# Patient Record
Sex: MALE | Race: WHITE | Employment: FULL TIME | URBAN - METROPOLITAN AREA
[De-identification: names, ages, dates, MRNs, and addresses within clinical notes are randomized per-mention and may not be internally consistent; named-entity substitution may affect disease eponyms.]

---

## 2017-07-12 ENCOUNTER — HOSPITAL ENCOUNTER (OUTPATIENT)
Dept: PHYSICAL THERAPY | Age: 32
Discharge: HOME | End: 2017-07-12
Payer: COMMERCIAL

## 2017-07-12 PROCEDURE — 97110 THERAPEUTIC EXERCISES: CPT

## 2017-07-12 PROCEDURE — 97161 PT EVAL LOW COMPLEX 20 MIN: CPT

## 2017-07-12 NOTE — PROGRESS NOTES
PT DAILY TREATMENT NOTE/KNEE EVAL 3-16    Patient Name: Paddy Dangelo  Date:2017  : 1985  [x]  Patient  Verified  Payor: [address] [place] [address] Road / Plan: [employer] / Product Type: Workers Comp /    In [place] time:1056  Total Treatment Time (min): 31  Total Timed Codes (min)8  1:1 Treatment Time (MC only): 8  Visit #: 1 of 12    Treatment Area: Pain in right knee [M25.561]    SUBJECTIVE  Pain Level (0-10 scale): 5  [x]constant []intermittent [x]improving []worsening []no change since onset  WORSE lots of moving of the knee, better with sitting still. Any medication changes, allergies to medications, adverse drug reactions, diagnosis change, or new procedure performed?: [x] No    [] Yes (see summary sheet for update)  Subjective functional status/changes:     PLOF:I   Limitations to PLOF: P/O pain and LE subjective reports  Mechanism of Injury: work related injury on 2016, slipped in a muddy patch, caught self and injured the R knee  Current symptoms/Complaints: 34 YO male diagnosed as above and with S/S consistent with above diagnosis presents to skilled outpatient PT. CCO P/O pain in the right knee and also in anterior thigh quad (dull pain, more persistant) and infrapatellar region (intermittent , sharp and goes away after a little bit)  as well. Pain is 5/10 . WORSE lots of moving of the knee, better with sitting still. He reports a work related injury on 2016, slipped in a muddy patch, caught self and injured the R knee. He has since had an MRI and Arthroscopic surgery 17. He now presents for skilled outpatient PT.    Previous Treatment/Compliance: Urgent care, orthopedic, Pre op PT, MRI, cortisone injection  PMHx/Surgical Hx: none  Work Hx: presently OOW due to surgery, normally works Full time as an ,   Living Situation: lives in an apartment, first floor, no steps to enter,   Pt Goals: reconditioning, normal function after surgery  Barriers: [x]pain -financial -time X transportation -other  Motivation: high  Substance use: []Alcohol []Tobacco []other:   FABQ Score: []low []elevate  Cognition: A & O x 4 Other:    OBJECTIVE/EXAMINATION  Domestic Life: works full time , household, reading, hiking, running and electronics  Activity/Recreational Limitations: P/O pain  Mobility:I  Self Care:  I        Modality rationale:     Min Type Additional Details    [] Estim:  []Unatt       []IFC  []Premod                        []Other:  []w/ice   []w/heat  Position:  Location:    [] Estim: []Att    []TENS instruct  []NMES                    []Other:  []w/US   []w/ice   []w/heat  Position:  Location:    []  Traction: [] Cervical       []Lumbar                       [] Prone          []Supine                       []Intermittent   []Continuous Lbs:  [] before manual  [] after manual    []  Ultrasound: []Continuous   [] Pulsed                           []1MHz   []3MHz Location:  W/cm2:    []  Iontophoresis with dexamethasone         Location: [] Take home patch   [] In clinic    []  Ice     []  heat  []  Ice massage  []  Laser   []  Anodyne Position:  Location:    []  Laser with stim  []  Other: Position:  Location:    []  Vasopneumatic Device Pressure:       [] lo [] med [] hi   Temperature: [] lo [] med [] hi   [] Skin assessment post-treatment:  []intact []redness- no adverse reaction    []redness  adverse reaction:     23 min [x]Eval                  []Re-Eval       8 min Therapeutic Exercise:  [x] See flow sheet :   Rationale: increase ROM, increase strength and improve coordination to improve the patients ability to aid with increase tolerance to ADLS and activities     min Therapeutic Activity:  []  See flow sheet :   Rationale:   to improve the patients ability to       min Neuromuscular Re-education:  []  See flow sheet :   Rationale:   to improve the patients ability to      min Manual Therapy:     Rationale:  to      min Gait Training:  ___ feet with ___ device on level surfaces with ___ level of assist   Rationale:           With   [] TE   [] TA   [] neuro   [] other: Patient Education: [x] Review HEP    [] Progressed/Changed HEP based on:   [] positioning   [] body mechanics   [] transfers   [] heat/ice application    [] other:      Other Objective/Functional Measures: quad set L excellent, R Fair    Physical Therapy Evaluation - Knee    Posture: [] Varus    [] Valgus    [] Recurvatum        [] Tibial Torsion    [] Foot Supination    [] Foot Pronation    Describe:    Gait:  [] Normal    [x] Abnormal    [x] Antalgic    [] NWB    Device:NONE    Describe:WBAT R, decrease stride, decrease pace, decrease heel toe,    ROM / Strength  [] Unable to assess                  AROM                      PROM                   Strength (1-5)    Left Right Left Right Left Right   Hip Flexion     5 4    Extension          Abduction          Adduction         Knee Flexion 135 supine 105 sitting  117 supine   5 4    Extension -3 sitting -10 sitting  -5 supine   5 4-   Ankle Plantarflexion           Dorsiflexion             Flexibility: [] Unable to assess at this time  Hamstrings:    (L) Tightness= [] WNL   [x] Min   [] Mod   [] Severe    (R) Tightness= [] WNL   [x] Min   [] Mod   [] Severe  Quadriceps:    (L) Tightness= [] WNL   [] Min   [] Mod   [] Severe    (R) Tightness= [] WNL   [] Min   [] Mod   [] Severe  Gastroc:      (L) Tightness= [] WNL   [x] Min   [] Mod   [] Severe    (R) Tightness= [] WNL   [x] Min   [] Mod   [] Severe  Other:    Palpation:  Generalized P/O TTP R knee   Neg/Pos  Neg/Pos  Neg/Pos   Joint Line  Quad tendon  Patellar ligament    Patella  Fibular head  Pes Anserinus    Tibial tubercle  Hamstring tendons  Infrapatellar fat pad      Optional Tests:  Patellar Positioning (Static)   []L []R Normal []L []R Lateral   []L []R Debbie Somers      []L []R Medial   []L []R Baja    Patellar Tracking   []L []R Glide (Lat)   []L []R Tilt (Lat)     []L []R Glide (Med)  []L []R Tilt (Med)      []L []R Gab (Inf)     Patellar Mobility   []L []R Hypermobile []L [x]R Hypomobile         Girth Measurements:     Cm at  Cm above joint line   Cm at   Cm below joint line  Cm at joint line   Left        Right           Lachmans  [] Neg    [] Pos Posterior Drawer [] Neg    [] Pos  Pivot Shift  [] Neg    [] Pos Posterior Sag  [] Neg    [] Pos  SELVIN   [] Neg    [] Pos Pieter's Test [] Neg    [] Pos  ALRI   [] Neg    [] Pos Squat   [] Neg    [] Pos  Valgus@ 0 Degrees [] Neg    [] Pos Aba-Riki [] Neg    [] Pos  Valgus@ 30 Degrees [] Neg    [] Pos Patellar Apprehension [] Neg    [] Pos  Varus@ 0 Degrees [] Neg    [] Pos Lemus's Compression [] Neg    [] Pos  Varus@ 30 Degrees [] Neg    [] Pos Ely's Test  [] Neg    [] Pos  Apley's Compression [] Neg    [] Pos Rolando's Test  [] Neg    [] Pos  Apley's Distraction [] Neg    [] Pos Stroke Test  [] Neg    [] Pos   Anterior Drawer [] Neg    [] Pos Fluctuation Test [] Neg    [] Pos  Other:                  [] Neg    [] Pos                 Other tests/comments: crepitus L none,  R mild       Pain Level (0-10 scale) post treatment: 5    ASSESSMENT/Changes in Function: Patient demonstrates the potential to make gains with improved ROM, strength, endurance/activity tolerance, functional FOTO survey score and all within a reasonable time frame so as to increase their functional independence with ADLs and activities for carryover to  Improved quality of life, tolerance to work demands and community activiites. Patient requires skilled Physical Therapy so as to monitor their response to and modify their treatment plan accordingly. Patient appears to be an appropriate candidate for skilled outpatient Physical Therapy.     Patient will continue to benefit from skilled PT services to modify and progress therapeutic interventions, address functional mobility deficits, address ROM deficits, address strength deficits, analyze and address soft tissue restrictions, analyze and cue movement patterns, analyze and modify body mechanics/ergonomics, assess and modify postural abnormalities and instruct in home and community integration to attain remaining goals.      [x]  See Plan of Care  []  See progress note/recertification  []  See Discharge Summary         Progress towards goals / Updated goals:       PLAN  [x]  Upgrade activities as tolerated     [x]  Continue plan of care  []  Update interventions per flow sheet       []  Discharge due to:_  []  Other:_      Sandrita Parry, PT 7/12/2017  10:16 AM

## 2017-07-12 NOTE — PROGRESS NOTES
[facility]  [address] [facility], [address] [address],[place] [address]  [phone] fax    Plan of Care/ Statement of Necessity for Physical Therapy Services    Patient name: Emir Thompson Start of Care: 2017   Referral source: Darien Parnell MD : 1985    Medical Diagnosis: Pain in right knee [M25.561]   Onset Date:16   Treatment Diagnosis: decrease tolerance to ADLs and activities due to R knee P/O pain, LOM, decrease strength   Prior Hospitalization: see medical history Provider#: [id number]   Medications: Verified on Patient summary List    Comorbidities: none   Prior Level of Function: I all areas of ADLS and activities, no AD use, works full time ,household, reading, hiking, running and electronics      The Plan of Care and following information is based on the information from the initial evaluation. Assessment/ key information: 34 YO male diagnosed as above and with S/S consistent with above diagnosis presents to skilled outpatient PT. CCO P/O pain in the right knee and also in anterior thigh quad (dull pain, more persistant) and infrapatellar region (intermittent , sharp and goes away after a little bit)  as well. Pain is 5/10 . WORSE lots of moving of the knee, better with sitting still. He reports a work related injury on 2016, slipped in a muddy patch, caught self and injured the R knee. He has since had an MRI and Arthroscopic surgery 17. He now presents for skilled outpatient PT. Previous Treatment/Compliance: Urgent care, orthopedic, Pre op PT, MRI, cortisone injection.   Pain 5, FOTO 56 , quad set L excellent, R Fair, abnormal antalgic gait with  No AD use, WBAT R, decrease stride, decrease pace, decrease heel toe, AROM R knee Supine F 117 and E -5, MMT R hip F and knee F 4 and Knee E 4-, Min B gastroc and hamstring tightness,   Generalized TTP P/O R knee, mild crepitus R,  Patient demonstrates the potential to make gains with improved ROM, strength, endurance/activity tolerance, functional FOTO survey score and all within a reasonable time frame so as to increase their functional independence with ADLs and activities for carryover to  Improved quality of life, tolerance to work demands and community activiites. Patient requires skilled Physical Therapy so as to monitor their response to and modify their treatment plan accordingly. Patient appears to be an appropriate candidate for skilled outpatient Physical Therapy.      Evaluation Complexity History LOW Complexity : Zero comorbidities / personal factors that will impact the outcome / POC; Examination HIGH Complexity : 4+ Standardized tests and measures addressing body structure, function, activity limitation and / or participation in recreation  ;Presentation LOW Complexity : Stable, uncomplicated  ;Clinical Decision Making MEDIUM Complexity : FOTO score of 26-74  Overall Complexity Rating: LOW   Problem List: pain affecting function, decrease ROM, decrease strength, edema affecting function, impaired gait/ balance, decrease ADL/ functional abilitiies, decrease activity tolerance, decrease flexibility/ joint mobility, decrease transfer abilities and other FOTO 56   Treatment Plan may include any combination of the following: Therapeutic exercise, Therapeutic activities, Neuromuscular re-education, Physical agent/modality, Gait/balance training, Manual therapy, Patient education, Self Care training, Functional mobility training, Home safety training, Stair training and Other:    Patient / Family readiness to learn indicated by: asking questions, trying to perform skills and interest  Persons(s) to be included in education: patient (P)  Barriers to Learning/Limitations: None  Patient Goal (s): reconditioning, normal function after surgery  Patient Self Reported Health Status: good  Rehabilitation Potential: good    Short Term Goals:  To be accomplished in 5 treatments:   1 patient will have established and be independent with HEP to aid with progression of skilled PT program   EVAL issued   CURRENT   2 patient will have pain 3/10 to aid with increase tolerance to ADLs and activities   EVAL 5   CURRENT   3 patient will have FOTO  Improved to 62 to aid with increase tolerance to ADLs and activities   EVAL 56   CURRENT     Long Term Goals: To be accomplished in 12 treatments:   1 patient will have pain 1/10 to aid with increase tolerance to ADLs and activities   EVAL 5   CURRENT   2 patient will have FOTO  Improved to 68 to aid with increase tolerance to ADLs and activities   EVAL 56   CURRENT   3 patient will have ROM 0-120 to aid with improved gait mechanics for carryover to work demands   EVAL -5 supine and 117 supine   CURRENT   4 patient will have MMT R knee F/E/hip F 4+ to aid with increase tolerance to ADLS and activities   EVAL Hip and knee F 4 and knee E4-   CURRENT   5 patient will ambulate on TM 1/4 mile with no significant increase pain for carryover to ADLS and activities and work demands   EVAL no AD use, antalgic gait. CURRENT    Frequency / Duration: Patient to be seen 2-3 times per week for 12 treatments. Patient/ Caregiver education and instruction: Diagnosis, prognosis, self care, activity modification and exercises   [x]  Plan of care has been reviewed with MATT Delong, PT 7/12/2017 10:17 AM  ________________________________________________________________________    I certify that the above Therapy Services are being furnished while the patient is under my care. I agree with the treatment plan and certify that this therapy is necessary.     Physician's Signature:____________________  Date:____________Time: _________    Please sign and return to [facility] [address] [place] [address], [address] [place] [address],[facility] [address]  [phone] fax

## 2017-07-18 ENCOUNTER — APPOINTMENT (OUTPATIENT)
Dept: PHYSICAL THERAPY | Age: 32
End: 2017-07-18
Payer: COMMERCIAL

## 2017-07-19 ENCOUNTER — HOSPITAL ENCOUNTER (OUTPATIENT)
Dept: PHYSICAL THERAPY | Age: 32
Discharge: HOME | End: 2017-07-19
Payer: COMMERCIAL

## 2017-07-19 PROCEDURE — 97035 APP MDLTY 1+ULTRASOUND EA 15: CPT

## 2017-07-19 PROCEDURE — 97110 THERAPEUTIC EXERCISES: CPT

## 2017-07-19 PROCEDURE — 97140 MANUAL THERAPY 1/> REGIONS: CPT

## 2017-07-19 NOTE — PROGRESS NOTES
PT DAILY TREATMENT NOTE - [facility]     Patient Name: Danilo Stevens  Date:2017  : 1985  [x]  Patient  Verified  Payor: [address] [place] [address] Road / Plan: [employer] / Product Type: Workers Comp /    In time:9:55  Out time:10:42  Total Treatment Time (min): 39   Visit #: 2 of 12    Treatment Area: Pain in right knee [M25.561]    SUBJECTIVE  Pain Level (0-10 scale): 3  Any medication changes, allergies to medications, adverse drug reactions, diagnosis change, or new procedure performed?: [x] No    [] Yes (see summary sheet for update)  Subjective functional status/changes:   [] No changes reported  Little  Pain.     OBJECTIVE    Modality rationale: decrease edema, decrease inflammation, decrease pain and increase tissue extensibility to improve the patients ability to perform ADL    Min Type Additional Details    [] Estim:  []Unatt       []IFC  []Premod                        []Other:  []w/ice   []w/heat  Position:  Location:    [] Estim: []Att    []TENS instruct  []NMES                    []Other:  []w/US   []w/ice   []w/heat  Position:  Location:    []  Traction: [] Cervical       []Lumbar                       [] Prone          []Supine                       []Intermittent   []Continuous Lbs:  [] before manual  [] after manual    [x]  Ultrasound: [x]Continuous   [] Pulsed              8             [x]1MHz   []3MHz W/cm2:1.3  Location:inferior  (R) knee    []  Iontophoresis with dexamethasone         Location: [] Take home patch   [] In clinic    []  Ice     []  heat  []  Ice massage  []  Laser   []  Anodyne Position:  Location:    []  Laser with stim  []  Other:  Position:  Location:    []  Vasopneumatic Device Pressure:       [] lo [] med [] hi   Temperature: [] lo [] med [] hi   [x] Skin assessment post-treatment:  [x]intact []redness- no adverse reaction    []redness  adverse reaction:      min []Eval                  []Re-Eval       23 min Therapeutic Exercise:  [x] See flow sheet :   Rationale: increase ROM and increase strength to improve the patients ability to perform ADL     min Therapeutic Activity:  []  See flow sheet :   Rationale:   to improve the patients ability to       min Neuromuscular Re-education:  []  See flow sheet :   Rationale:   to improve the patients ability to     8 min Manual Therapy:  Patella  Mob/passive stretch  Knee  F/EXT   Rationale: decrease pain, increase ROM, increase tissue extensibility and decrease edema  to perform ADL      min Gait Training:  ___ feet with ___ device on level surfaces with ___ level of assist   Rationale: With   [x] TE   [] TA   [] neuro   [] other: Patient Education: [x] Review HEP    [] Progressed/Changed HEP based on:   [] positioning   [] body mechanics   [] transfers   [] heat/ice application    [] other:      Other Objective/Functional Measures:  Minimal  Pain with  Full knee  Flex  During  manual    Pain Level (0-10 scale) post treatment: 0    ASSESSMENT/Changes in Function: Completed  Each there ex. Patient will continue to benefit from skilled PT services to address functional mobility deficits, address ROM deficits, address strength deficits, analyze and address soft tissue restrictions, analyze and cue movement patterns and instruct in home and community integration to attain remaining goals.      [x]  See Plan of Care  []  See progress note/recertification  []  See Discharge Summary         Progress towards goals / Updated goals:             1 patient will have established and be independent with HEP to aid with progression of skilled PT program                         EVAL issued                         CURRENT                         2 patient will have pain 3/10 to aid with increase tolerance to ADLs and activities                         EVAL 5                         CURRENT                         3 patient will have FOTO  Improved to 62 to aid with increase tolerance to ADLs and activities                         EVAL 56 CURRENT      Long Term Goals: To be accomplished in 12 treatments:                         1 patient will have pain 1/10 to aid with increase tolerance to ADLs and activities                         EVAL 5                         CURRENT                         2 patient will have FOTO  Improved to 68 to aid with increase tolerance to ADLs and activities                         EVAL 56                         CURRENT                         3 patient will have ROM 0-120 to aid with improved gait mechanics for carryover to work demands                         EVAL -5 supine and 117 supine                         CURRENT                         4 patient will have MMT R knee F/E/hip F 4+ to aid with increase tolerance to ADLS and activities                         EVAL Hip and knee F 4 and knee E4-                         CURRENT                         5 patient will ambulate on TM 1/4 mile with no significant increase pain for carryover to ADLS and activities and work demands                         EVAL no AD use, antalgic gait.                          CURRENT    PLAN  []  Upgrade activities as tolerated     [x]  Continue plan of care  []  Update interventions per flow sheet       []  Discharge due to:_  []  Other:_      Lyndsey Correa PTA 7/19/2017  9:58 AM    Future Appointments  Date Time Provider Franko Lomas   7/19/2017 10:00 AM Lyndsey Correa PTA [facility]   7/21/2017 10:00 AM Karl Galo, PT [facility]   7/24/2017 11:00 AM Lyndsey Correa PTA [facility]   7/26/2017 9:30 AM Karl Galo, PT [facility]   7/28/2017 10:30 AM [facility]   7/31/2017 10:00 AM Lyndsey Correa PTA [facility]

## 2017-07-21 ENCOUNTER — HOSPITAL ENCOUNTER (OUTPATIENT)
Dept: PHYSICAL THERAPY | Age: 32
Discharge: HOME | End: 2017-07-21
Payer: COMMERCIAL

## 2017-07-21 PROCEDURE — 97110 THERAPEUTIC EXERCISES: CPT

## 2017-07-21 PROCEDURE — 97035 APP MDLTY 1+ULTRASOUND EA 15: CPT

## 2017-07-21 NOTE — PROGRESS NOTES
PT DAILY TREATMENT NOTE     Patient Name: Travon Padilla  PFWX:  : 1985  [x]  Patient  Verified  Payor: Ra Moyer / Plan: Harley Boast / Product Type: Workers Comp /    In time:1000  Out time:1056  Total Treatment Time (min): 64  Visit #: 3 of 12    Treatment Area: Pain in right knee [M25.561]    SUBJECTIVE  Pain Level (0-10 scale): 2-3  Any medication changes, allergies to medications, adverse drug reactions, diagnosis change, or new procedure performed?: [x] No    [] Yes (see summary sheet for update)  Subjective functional status/changes:   [] No changes reported  I think some of the exercises are a little easy, I was used to doing more at the other place ( note however this was preop)    OBJECTIVE    Modality rationale: decrease inflammation, decrease pain and increase tissue extensibility to improve the patients ability to aid with increase tolerance to ADLs.     Min Type Additional Details    [] Estim:  []Unatt       []IFC  []Premod                        []Other:  []w/ice   []w/heat  Position:  Location:    [] Estim: []Att    []TENS instruct  []NMES                    []Other:  []w/US   []w/ice   []w/heat  Position:  Location:    []  Traction: [] Cervical       []Lumbar                       [] Prone          []Supine                       []Intermittent   []Continuous Lbs:  [] before manual  [] after manual   8 [x]  Ultrasound: [x]Continuous   [] Pulsed                           [x]1MHz   []3MHz W/cm2:1.3  Location:R KNEE    []  Iontophoresis with dexamethasone         Location: [] Take home patch   [] In clinic   10 [x]  Ice   post  []  heat  []  Ice massage  []  Laser   []  Anodyne Position:reclined  Location:R knee    []  Laser with stim  []  Other:  Position:  Location:    []  Vasopneumatic Device Pressure:       [] lo [] med [] hi   Temperature: [] lo [] med [] hi   [] Skin assessment post-treatment:  []intact []redness- no adverse reaction    []redness  adverse reaction: min []Eval                  []Re-Eval       38 min Therapeutic Exercise:  [x] See flow sheet :   Rationale: increase ROM, increase strength and improve coordination to improve the patients ability to aid with increase tolerance to ADLS and activities     min Therapeutic Activity:  []  See flow sheet :   Rationale:   to improve the patients ability to       min Neuromuscular Re-education:  []  See flow sheet :   Rationale:   to improve the patients ability to      min Manual Therapy:     Rationale:  to      min Gait Training:  ___ feet with ___ device on level surfaces with ___ level of assist   Rationale: With   [] TE   [] TA   [] neuro   [] other: Patient Education: [x] Review HEP    [] Progressed/Changed HEP based on:   [] positioning   [] body mechanics   [] transfers   [] heat/ice application    [] other:      Other Objective/Functional Measures: increased reps on heel slides    Pain Level (0-10 scale) post treatment:2-3    ASSESSMENT/Changes in Function: tolerated well. Patient will continue to benefit from skilled PT services to modify and progress therapeutic interventions, address functional mobility deficits, address ROM deficits, address strength deficits, analyze and address soft tissue restrictions, analyze and cue movement patterns, analyze and modify body mechanics/ergonomics, assess and modify postural abnormalities and instruct in home and community integration to attain remaining goals.      [x]  See Plan of Care  []  See progress note/recertification  []  See Discharge Summary         Progress towards goals / Updated goals:     1 patient will have established and be independent with HEP to aid with progression of skilled PT program                         EVAL issued                         [id number] reports compliance 7/21/17                         2 patient will have pain 3/10 to aid with increase tolerance to ADLs and activities                         EVAL 5                         CURRENT 2-3 7/21/17                         3 patient will have FOTO  Improved to 62 to aid with increase tolerance to ADLs and activities                         EVAL 56  22 [place] Am[place]o be accomplished in 12 treatments:                         5 patient will have pain 1/10 to aid with increase tolerance to ADLs and activities                         EVAL 5                         CURRENT 3 7/27/17                         2 patient will have FOTO  Improved to 68 to aid with increase tolerance to ADLs and activities                         YIHX 15                         DGBJODQ                         3 patient will have ROM 0-120 to aid with improved gait mechanics for carryover to work demands                         EVAL -5 supine and 117 supine                         CURRENT                         4 patient will have MMT R knee F/E/hip F 4+ to aid with increase tolerance to ADLS and activities                         EVAL Hip and knee F 4 and knee E4-                         CURRENT                         5 patient will ambulate on TM 1/4 mile with no significant increase pain for carryover to ADLS and activities and work demands                         EVAL no AD use, antalgic gait.                          CURRENT    PLAN  [x]  Upgrade activities as tolerated     [x]  Continue plan of care  []  Update interventions per flow sheet       []  Discharge due to:_  [x]  Other:_  Add 2# to PRES for SLR, SAQ and [place], PT 7/21/2017  10:30 AM    Future Appointments  Date Time Provider Franko Lomas   7/24/2017 11:00 AM Lyndsey Caceres PTA [facility]   7/26/2017 9:30 AM Nevin Da Silva PT [facility]   7/28/2017 10:30 AM [facility]   7/31/2017 10:00 AM Lyndsey Correa PTA [facility]

## 2017-07-24 ENCOUNTER — HOSPITAL ENCOUNTER (OUTPATIENT)
Dept: PHYSICAL THERAPY | Age: 32
Discharge: HOME | End: 2017-07-24
Payer: COMMERCIAL

## 2017-07-24 PROCEDURE — 97035 APP MDLTY 1+ULTRASOUND EA 15: CPT

## 2017-07-24 PROCEDURE — 97110 THERAPEUTIC EXERCISES: CPT

## 2017-07-24 NOTE — PROGRESS NOTES
PT DAILY TREATMENT NOTE - [facility]     Patient Name: Susan Amezcua  Date:2017  : 1985  [x]  Patient  Verified  Payor: [address] [place] [address] Road / Plan: Huan Aldana / Product Type: Workers Comp /    In time:11:00  Out time:12:04  Total Treatment Time (min): 56  Visit #: 4 of 12    Treatment Area: Pain in right knee [M25.561]    SUBJECTIVE  Pain Level (0-10 scale): 2  Any medication changes, allergies to medications, adverse drug reactions, diagnosis change, or new procedure performed?: [x] No    [] Yes (see summary sheet for update)  Subjective functional status/changes:   [] No changes reported  Little pain.     OBJECTIVE    Modality rationale: decrease edema, decrease inflammation, decrease pain and increase tissue extensibility to improve the patients ability to perform ADL    Min Type Additional Details    [] Estim:  []Unatt       []IFC  []Premod                        []Other:  []w/ice   []w/heat  Position:  Location:    [] Estim: []Att    []TENS instruct  []NMES                    []Other:  []w/US   []w/ice   []w/heat  Position:  Location:    []  Traction: [] Cervical       []Lumbar                       [] Prone          []Supine                       []Intermittent   []Continuous Lbs:  [] before manual  [] after manual    [x]  Ultrasound: [x]Continuous   [] Pulsed              8             [x]1MHz   []3MHz W/cm2:1.3  Location:(R)  knee    []  Iontophoresis with dexamethasone         Location: [] Take home patch   [] In clinic   10 [x]  Ice  post   []  heat  []  Ice massage  []  Laser   []  Anodyne Position:supine  Location:(L) knee    []  Laser with stim  []  Other:  Position:  Location:    []  Vasopneumatic Device Pressure:       [] lo [] med [] hi   Temperature: [] lo [] med [] hi   [x] Skin assessment post-treatment:  [x]intact []redness- no adverse reaction    []redness  adverse reaction:      min []Eval                  []Re-Eval       33 min Therapeutic Exercise:  [x] See flow sheet :   Rationale: increase ROM and increase strength to improve the patients ability to perform ADL      min Therapeutic Activity:  []  See flow sheet :   Rationale:   to improve the patients ability to       min Neuromuscular Re-education:  []  See flow sheet :   Rationale:   to improve the patients ability to     5 min Manual Therapy:  Passive  Stretch  Knee  F/EXT   Rationale: decrease pain, increase ROM, increase tissue extensibility and decrease edema  to perform ADL      min Gait Training:  ___ feet with ___ device on level surfaces with ___ level of assist   Rationale: With   [x] TE   [] TA   [] neuro   [] other: Patient Education: [x] Review HEP    [] Progressed/Changed HEP based on:   [] positioning   [] body mechanics   [] transfers   [] heat/ice application    [] other:      Other Objective/Functional Measures:  Knee  F  130   Ext  -5  Ambulated  100ft . Pain Level (0-10 scale) post treatment: 0    ASSESSMENT/Changes in Function: Minimal  Pain at  Incision. Fair  Response to each there ex. Improved  In knee  Flexion. Patient will continue to benefit from skilled PT services to address functional mobility deficits, address ROM deficits, address strength deficits, analyze and address soft tissue restrictions, analyze and cue movement patterns and instruct in home and community integration to attain remaining goals.      [x]  See Plan of Care  []  See progress note/recertification  []  See Discharge Summary         Progress towards goals / Updated goals:   1 patient will have established and be independent with HEP to aid with progression of skilled PT program                         EVAL issued                         [facility] reports compliance 7/21/17                         2 patient will have pain 3/10 to aid with increase tolerance to ADLs and activities                         EVAL 5                         CURRENT 2-3 7/21/17                         3 patient will have FOTO  Improved to 62 to aid with increase tolerance to ADLs and activities                         EVAL 56  22 [place] Amadeo be accomplished in 12 treatments:                         6 patient will have pain 1/10 to aid with increase tolerance to ADLs and activities                         EVAL 5                         CURRENT 3 7/27/17                         2 patient will have FOTO  Improved to 68 to aid with increase tolerance to ADLs and activities                         GUBF 97                         WSMWKBJ                         3 patient will have ROM 0-120 to aid with improved gait mechanics for carryover to work demands                         EVAL -5 supine and 117 supine                         CURRENT  130  F    Ext   -5   7/24/17                         4 patient will have MMT R knee F/E/hip F 4+ to aid with increase tolerance to ADLS and activities                         EVAL Hip and knee F 4 and knee E4-                         CURRENT                         5 patient will ambulate on TM 1/4 mile with no significant increase pain for carryover to ADLS and activities and work demands                         EVAL no AD use, antalgic gait.                          CURRENT       PLAN  []  Upgrade activities as tolerated     [x]  Continue plan of care  []  Update interventions per flow sheet       []  Discharge due to:_  []  Other:_      Lyndsey Correa PTA 7/24/2017  11:24 AM    Future Appointments  Date Time Provider Franko Lomas   7/26/2017 9:30 AM Eliud Dior, PT [facility]   7/28/2017 10:30 AM [facility]   7/31/2017 10:00 AM Lyndsey Correa PTA [facility]

## 2017-07-26 ENCOUNTER — HOSPITAL ENCOUNTER (OUTPATIENT)
Dept: PHYSICAL THERAPY | Age: 32
Discharge: HOME | End: 2017-07-26
Payer: COMMERCIAL

## 2017-07-26 PROCEDURE — 97110 THERAPEUTIC EXERCISES: CPT

## 2017-07-26 PROCEDURE — 97035 APP MDLTY 1+ULTRASOUND EA 15: CPT

## 2017-07-26 NOTE — PROGRESS NOTES
PT DAILY TREATMENT NOTE     Patient Name: Kasey Ventura  Date:2017  : 1985  [x]  Patient  Verified  Payor: [address] [place] [address] Road / Plan: [employer] / Product Type: Workers Comp /    In [place]ia Jackson UAyaka Garces.  Total Treatment Time (min): 52  Visit #: 5 of 12    Treatment Area: Pain in right knee [M25.561]    SUBJECTIVE  Pain Level (0-10 scale): 2  Any medication changes, allergies to medications, adverse drug reactions, diagnosis change, or new procedure performed?: [x] No    [] Yes (see summary sheet for update)  Subjective functional status/changes:   [] No changes reported  I feel better and I have more ROM but I know am not able to run. I haven't tried , only fast walking and I feel bumbly like it is going to give out.     OBJECTIVE    Modality rationale: decrease inflammation, decrease pain and increase tissue extensibility to improve the patients ability to aid with increase tolerance to ADLS and activities   Min Type Additional Details    [] Estim:  []Unatt       []IFC  []Premod                        []Other:  []w/ice   []w/heat  Position:  Location:    [] Estim: []Att    []TENS instruct  []NMES                    []Other:  []w/US   []w/ice   []w/heat  Position:  Location:    []  Traction: [] Cervical       []Lumbar                       [] Prone          []Supine                       []Intermittent   []Continuous Lbs:  [] before manual  [] after manual   8 [x]  Ultrasound: [x]Continuous   [] Pulsed                           [x]1MHz   []3MHz W/cm2:1.3  Location:R knee    []  Iontophoresis with dexamethasone         Location: [] Take home patch   [] In clinic   10 [x]  Ice post    []  heat  []  Ice massage  []  Laser   []  Anodyne Position:reclined  Location:R knee    []  Laser with stim  []  Other:  Position:  Location:    []  Vasopneumatic Device Pressure:       [] lo [] med [] hi   Temperature: [] lo [] med [] hi   [] Skin assessment post-treatment:  []intact []redness- no adverse reaction []redness  adverse reaction:       min []Eval                  []Re-Eval       34 min Therapeutic Exercise:  [] See flow sheet :   Rationale: increase ROM, increase strength and improve coordination to improve the patients ability to aid with increase tolerance to ADLs and activities     min Therapeutic Activity:  []  See flow sheet :   Rationale:   to improve the patients ability to       min Neuromuscular Re-education:  []  See flow sheet :   Rationale:   to improve the patients ability to      min Manual Therapy:     Rationale:  to      min Gait Training:  ___ feet with ___ device on level surfaces with ___ level of assist   Rationale: With   [] TE   [] TA   [] neuro   [] other: Patient Education: [x] Review HEP    [] Progressed/Changed HEP based on:   [] positioning   [] body mechanics   [] transfers   [] heat/ice application    [] other:      Other Objective/Functional Measures: VC exercises and tech     Pain Level (0-10 scale) post treatment: 0    ASSESSMENT/Changes in Function: Returns to PT after MD follow up. Residual C/O weakness . Tolerated all exercises well. Patient will continue to benefit from skilled PT services to modify and progress therapeutic interventions, address functional mobility deficits, address ROM deficits, address strength deficits, analyze and address soft tissue restrictions, analyze and cue movement patterns, analyze and modify body mechanics/ergonomics, assess and modify postural abnormalities and instruct in home and community integration to attain remaining goals.      [x]  See Plan of Care  []  See progress note/recertification  []  See Discharge Summary         Progress towards goals / Updated goals:    1 patient will have established and be independent with HEP to aid with progression of skilled PT program                         EVAL issued                         [facility] reports compliance 7/21/17 7/26/17                         2 patient will have pain 3/10 to aid with increase tolerance to ADLs and activities                         EVAL 5                         CURRENT 2 7/26/17                         3 patient will have FOTO  Improved to 62 to aid with increase tolerance to ADLs and activities                         EVAL 56                         CURRENT  56 7/26/17      Long Term Goals: To be accomplished in 12 treatments:                         1 patient will have pain 1/10 to aid with increase tolerance to ADLs and activities                         EVAL 5                         CURRENT 2/10  7/26/17                         2 patient will have FOTO  Improved to 68 to aid with increase tolerance to ADLs and activities                         RMBV 45                         UGQHWTF  00 7/26/17                         3 patient will have ROM 0-120 to aid with improved gait mechanics for carryover to work demands                         EVAL -5 supine and 117 supine                         CURRENT  130  F    Ext   -5   7/24/17                         4 patient will have MMT R knee F/E/hip F 4+ to aid with increase tolerance to ADLS and activities                         EVAL Hip and knee F 4 and knee E4-                         CURRENT                         5 patient will ambulate on TM 1/4 mile with no significant increase pain for carryover to ADLS and activities and work demands                         EVAL no AD use, antalgic gait.                          ZBRQLTH not yet initiated  7/26/17       PLAN  [x]  Upgrade activities as tolerated     [x]  Continue plan of care  []  Update interventions per flow sheet       []  Discharge due to:_  [x]  Other:_initiate TM      Lesa Almodovar PT 7/26/2017  9:31 AM    Future Appointments  Date Time Provider Franko Lomas   7/28/2017 10:30 AM [facility] PT [facility] 1 [facility]   7/31/2017 10:00 AM Lyndsey Correa PTA [facility]

## 2017-07-28 ENCOUNTER — HOSPITAL ENCOUNTER (OUTPATIENT)
Dept: PHYSICAL THERAPY | Age: 32
Discharge: HOME | End: 2017-07-28
Payer: COMMERCIAL

## 2017-07-28 PROCEDURE — 97110 THERAPEUTIC EXERCISES: CPT | Performed by: PHYSICAL THERAPIST

## 2017-07-28 NOTE — PROGRESS NOTES
PT DAILY TREATMENT NOTE     Patient Name: Lv Morris  CZOW:[id number]  : 1985  [x]  Patient  Verified  Payor: [address] [place] [address] Road / Plan: [facility] / Product Type: Workers Comp /    In time:10:31  Out time:11:13  Total Treatment Time (min): 42  Visit #: 6 of 12    Treatment Area: Pain in right knee [M25.561]    SUBJECTIVE  Pain Level (0-10 scale): 2.5  Any medication changes, allergies to medications, adverse drug reactions, diagnosis change, or new procedure performed?: [x] No    [] Yes (see summary sheet for update)  Subjective functional status/changes:   [] No changes reported  Feels okay. A little more pain, States it's probably because he slept funny.      OBJECTIVE    Modality rationale: decrease inflammation and decrease pain to improve the patients ability to complete ADLs   Min Type Additional Details    [] Estim:  []Unatt       []IFC  []Premod                        []Other:  []w/ice   []w/heat  Position:  Location:    [] Estim: []Att    []TENS instruct  []NMES                    []Other:  []w/US   []w/ice   []w/heat  Position:  Location:    []  Traction: [] Cervical       []Lumbar                       [] Prone          []Supine                       []Intermittent   []Continuous Lbs:  [] before manual  [] after manual    []  Ultrasound: []Continuous   [] Pulsed                           []1MHz   []3MHz W/cm2:  Location:    []  Iontophoresis with dexamethasone         Location: [] Take home patch   [] In clinic   10 [x]  Ice     []  heat  []  Ice massage  []  Laser   []  Anodyne Position:supine  Location: R anterior knee    []  Laser with stim  []  Other:  Position:  Location:    []  Vasopneumatic Device Pressure:       [] lo [] med [] hi   Temperature: [] lo [] med [] hi   [x] Skin assessment post-treatment:  [x]intact [x]redness- no adverse reaction    []redness  adverse reaction:     32 min Therapeutic Exercise:  [x] See flow sheet :   Rationale: increase ROM and increase strength to improve the patients ability to complete ADLs          With   [x] TE   [] TA   [] neuro   [] other: Patient Education: [x] Review HEP    [] Progressed/Changed HEP based on:   [] positioning   [] body mechanics   [] transfers   [] heat/ice application    [] other:         Pain Level (0-10 scale) post treatment: 1    ASSESSMENT/Changes in Function: Patient responds well to treatment session. Has minimal difficulty with exercises as prescribed. Progress as tolerated. No adverse effects were noted from today's treatment session      Patient will continue to benefit from skilled PT services to modify and progress therapeutic interventions, address functional mobility deficits, address ROM deficits, address strength deficits and analyze and address soft tissue restrictions to attain remaining goals.      [x]  See Plan of Care  []  See progress note/recertification  []  See Discharge Summary         Progress towards goals / Updated goals:   1 patient will have established and be independent with HEP to aid with progression of skilled PT program                         EVAL issued                         [facility] reports compliance 7/21/17 7/26/17                         2 patient will have pain 3/10 to aid with increase tolerance to ADLs and activities                         EVAL 5                         CURRENT 2 7/26/17                         3 patient will have FOTO  Improved to 62 to aid with increase tolerance to ADLs and activities                         EVAL 56                         CURRENT  56 7/26/17      Long Term Goals: To be accomplished in 12 treatments:                         1 patient will have pain 1/10 to aid with increase tolerance to ADLs and activities                         EVAL 5                         CURRENT 2/10  7/26/17                         2 patient will have [address]  Improved to 68 to aid with increase tolerance to ADLs and activities                         EVAL 56                         CURRENT  56 7/26/17                         3 patient will have ROM 0-120 to aid with improved gait mechanics for carryover to work demands                         EVAL -5 supine and 117 supine                         CURRENT  130  F    Ext   -5   7/24/17                         4 patient will have MMT R knee F/E/hip F 4+ to aid with increase tolerance to ADLS and activities                         EVAL Hip and knee F 4 and knee E4-                         CURRENT                         5 patient will ambulate on TM 1/4 mile with no significant increase pain for carryover to ADLS and activities and work demands                         EVAL no AD use, antalgic gait.                          GHWLDCJ Initiated 7/28/2017    PLAN  []  Upgrade activities as tolerated     [x]  Continue plan of care  []  Update interventions per flow sheet       []  Discharge due to:_  []  Other:_      Nam Foster DPT 7/28/2017  10:34 AM    Future Appointments  Date Time Provider Franko Lomas   7/31/2017 10:00 AM Lyndsey Caceres PTA [facility]

## 2017-07-31 ENCOUNTER — HOSPITAL ENCOUNTER (OUTPATIENT)
Dept: PHYSICAL THERAPY | Age: 32
Discharge: HOME | End: 2017-07-31
Payer: COMMERCIAL

## 2017-07-31 PROCEDURE — 97032 APPL MODALITY 1+ESTIM EA 15: CPT

## 2017-07-31 PROCEDURE — 97110 THERAPEUTIC EXERCISES: CPT

## 2017-07-31 NOTE — PROGRESS NOTES
PT DAILY TREATMENT NOTE - [facility]     Patient Name: Isaiah Gottlieb  [id number]  : 1985  [x]  Patient  Verified  Payor: [facility] / Plan: [employer] / Product Type: Workers Comp /    In [facility] time:10:58  Total Treatment Time (min): 52  Visit #: 7 of 12    Treatment Area: Pain in right knee [M25.561]    SUBJECTIVE  Pain Level (0-10 scale): 2-2.5  Any medication changes, allergies to medications, adverse drug reactions, diagnosis change, or new procedure performed?: [x] No    [] Yes (see summary sheet for update)  Subjective functional status/changes:   [] No changes reported  Some  Pain.     OBJECTIVE    Modality rationale: decrease edema, decrease inflammation, decrease pain and increase tissue extensibility to improve the patients ability to perform ADL   Min Type Additional Details    [] Estim:  []Unatt       []IFC  []Premod                        []Other:  []w/ice   []w/heat  Position:  Location:   8 [x] Estim: [x]Att    []TENS instruct  []NMES                    [x]Other: LTO []w/US   []w/ice   []w/heat  Position:long  sit  Location:(R)  Lateral/infeior knee    []  Traction: [] Cervical       []Lumbar                       [] Prone          []Supine                       []Intermittent   []Continuous Lbs:  [] before manual  [] after manual    []  Ultrasound: []Continuous   [] Pulsed                           []1MHz   []3MHz W/cm2:  Location:    []  Iontophoresis with dexamethasone         Location: [] Take home patch   [] In clinic    []  Ice     []  heat  []  Ice massage  []  Laser   []  Anodyne Position:  Location:    []  Laser with stim  []  Other:  Position:  Location:    []  Vasopneumatic Device Pressure:       [] lo [] med [] hi   Temperature: [] lo [] med [] hi   [x] Skin assessment post-treatment:  [x]intact []redness- no adverse reaction    []redness  adverse reaction:      min []Eval                  []Re-Eval       39 min Therapeutic Exercise:  [x] See flow sheet : Rationale: increase ROM and increase strength to improve the patients ability to perform ADL      min Therapeutic Activity:  []  See flow sheet :   Rationale:   to improve the patients ability to       min Neuromuscular Re-education:  []  See flow sheet :   Rationale:   to improve the patients ability to      min Manual Therapy:     Rationale: decrease pain, increase ROM, increase tissue extensibility and decrease edema  to perform ADL      min Gait Training:  ___ feet with ___ device on level surfaces with ___ level of assist   Rationale: With   [x] TE   [] TA   [] neuro   [] other: Patient Education: [x] Review HEP    [] Progressed/Changed HEP based on:   [] positioning   [] body mechanics   [] transfers   [] heat/ice application    [] other:      Other Objective/Functional Measures:  Increased  Rep per flow  sheet    Pain Level (0-10 scale) post treatment: 1-1.5    ASSESSMENT/Changes in Function: Experienced  Minimal  Pain with min-squats/steps. Patient will continue to benefit from skilled PT services to address functional mobility deficits, address ROM deficits, address strength deficits, analyze and address soft tissue restrictions and instruct in home and community integration to attain remaining goals.      [x]  See Plan of Care  []  See progress note/recertification  []  See Discharge Summary         Progress towards goals / Updated goals:   1 patient will have established and be independent with HEP to aid with progression of skilled PT program                         NEGRA JOHNS reports compliance 7/21/17 7/26/17                         2 patient will have pain 3/10 to aid with increase tolerance to ADLs and activities                         EVAL 5                         CURRENT 2 7/26/17                         3 patient will have FOTO  Improved to 62 to aid with increase tolerance to ADLs and activities                         EVAL [phone] 7/26/17      [address] [facility] be accomplished in 12 treatments:                         4 patient will have pain 1/10 to aid with increase tolerance to ADLs and activities                         EVAL 5                         CURRENT 2/10  7/26/17                         2 patient will have FOTO  Improved to 68 to aid with increase tolerance to ADLs and activities                         EVAL 56                         CURRENT  56 7/26/17                         3 patient will have ROM 0-120 to aid with improved gait mechanics for carryover to work demands                         EVAL -5 supine and 117 supine                         CURRENT  130  F    Ext   -5   7/24/17                         4 patient will have MMT R knee F/E/hip F 4+ to aid with increase tolerance to ADLS and activities                         EVAL Hip and knee F 4 and knee E4-                         CURRENT                         5 patient will ambulate on TM 1/4 mile with no significant increase pain for carryover to ADLS and activities and work demands                         EVAL no AD use, antalgic gait.                          CURRENT Initiated 7/28/2017    PLAN  []  Upgrade activities as tolerated     [x]  Continue plan of care  []  Update interventions per flow sheet       []  Discharge due to:_  [x]  Other:_ REASSESS MMT NV     Lyndsey Correa, PTA 7/31/2017  10:07 AM    Future Appointments  Date Time Provider Franko Lomas   8/2/2017 12:00 PM Lyndsey Caceres, PTA [facility]   8/4/2017 12:00 PM Crystal Madeline, PTA [facility]   8/8/2017 11:30 AM Crystal Madeline, PTA [facility]   8/9/2017 11:30 AM Crystal Madeline, [facility]   8/11/2017 12:30 PM Crystal Madeline, PTA [facility]

## 2017-08-02 ENCOUNTER — HOSPITAL ENCOUNTER (OUTPATIENT)
Dept: PHYSICAL THERAPY | Age: 32
Discharge: HOME | End: 2017-08-02
Payer: COMMERCIAL

## 2017-08-02 PROCEDURE — 97032 APPL MODALITY 1+ESTIM EA 15: CPT

## 2017-08-02 PROCEDURE — 97110 THERAPEUTIC EXERCISES: CPT

## 2017-08-02 NOTE — PROGRESS NOTES
PT DAILY TREATMENT NOTE - [facility]     Patient Name: Carlos Manuel Capellan  Date:2017  : 1985  [x]  Patient  Verified  Payor: [address] [place] [address] Road / Plan: [employer] / Product Type: Workers Comp /    In time:11:55   Out time:12:57  Total Treatment Time (min): 45  Visit #: 8 of 12    Treatment Area: Pain in right knee [M25.561]    SUBJECTIVE  Pain Level (0-10 scale): 2  Any medication changes, allergies to medications, adverse drug reactions, diagnosis change, or new procedure performed?: [x] No    [] Yes (see summary sheet for update)  Subjective functional status/changes:   [] No changes reported  Little  Pain.     OBJECTIVE    Modality rationale: decrease edema, decrease inflammation, decrease pain and increase tissue extensibility to improve the patients ability to perform ADL    Min Type Additional Details    [] Estim:  []Unatt       []IFC  []Premod                        []Other:  []w/ice   []w/heat  Position:  Location:    [] Estim: []Att    []TENS instruct  []NMES                    []Other:  []w/US   []w/ice   []w/heat  Position:  Location:    []  Traction: [] Cervical       []Lumbar                       [] Prone          []Supine                       []Intermittent   []Continuous Lbs:  [] before manual  [] after manual    []  Ultrasound: []Continuous   [] Pulsed                           []1MHz   []3MHz W/cm2:  Location:    []  Iontophoresis with dexamethasone         Location: [] Take home patch   [] In clinic    []  Ice     []  heat  []  Ice massage  []  Laser   []  Anodyne Position:  Location:    []  Laser with stim  []  Other:  Position:  Location:    []  Vasopneumatic Device Pressure:       [] lo [] med [] hi   Temperature: [] lo [] med [] hi   [x] Skin assessment post-treatment:  [x]intact []redness- no adverse reaction    []redness  adverse reaction:      min []Eval                  []Re-Eval       32 min Therapeutic Exercise:  [x] See flow sheet :   Rationale: increase ROM and increase strength to improve the patients ability to perform ADL     13 min Therapeutic Activity:  []  See flow sheet :   Rationale:   to improve the patients ability to perform ADL       min Neuromuscular Re-education:  []  See flow sheet :   Rationale:   to improve the patients ability to      min Manual Therapy:     Rationale: decrease pain, increase ROM, increase tissue extensibility and decrease edema  to perform ADL      min Gait Training:  ___ feet with ___ device on level surfaces with ___ level of assist   Rationale: With   [x] TE   [] TA   [] neuro   [] other: Patient Education: [x] Review HEP    [] Progressed/Changed HEP based on:   [] positioning   [] body mechanics   [] transfers   [] heat/ice application    [] other:      Other Objective/Functional Measures:  Experienced  Pain with sit  To stand/mini squat/SAQ    Pain Level (0-10 scale) post treatment: <2    ASSESSMENT/Changes in Function: Fair  Response  To remaining  There ex. Patient will continue to benefit from skilled PT services to address functional mobility deficits, address ROM deficits, address strength deficits, analyze and address soft tissue restrictions, analyze and cue movement patterns and instruct in home and community integration to attain remaining goals.      [x]  See Plan of Care  []  See progress note/recertification  []  See Discharge Summary         Progress towards goals / Updated goals:  1 patient will have established and be independent with HEP to aid with progression of skilled PT program                         EVAL issued                         SARANYA reports compliance 7/21/17 7/26/17                         2 patient will have pain 3/10 to aid with increase tolerance to ADLs and activities                         EVAL 5                         CURRENT 2 7/26/17                         3 patient will have FOTO  Improved to 62 to aid with increase tolerance to ADLs and activities                         EVAL [phone] 7/26/17      [address] [facility] be accomplished in 12 treatments:                         6 patient will have pain 1/10 to aid with increase tolerance to ADLs and activities                         EVAL 5                         CURRENT 2/10  7/26/17                         2 patient will have FOTO  Improved to 68 to aid with increase tolerance to ADLs and activities                         EVAL 56                         CURRENT  56 7/26/17                         3 patient will have ROM 0-120 to aid with improved gait mechanics for carryover to work demands                         EVAL -5 supine and 117 supine                         CURRENT  130  F    Ext   -5   7/24/17                         4 patient will have MMT R knee F/E/hip F 4+ to aid with increase tolerance to ADLS and activities                         EVAL Hip and knee F 4 and knee E4-                         CURRENT                         5 patient will ambulate on TM 1/4 mile with no significant increase pain for carryover to ADLS and activities and work demands                         EVAL no AD use, antalgic gait.                          CURRENT   5 min   No difficulty  8/2/17    PLAN  []  Upgrade activities as tolerated     [x]  Continue plan of care  []  Update interventions per flow sheet       []  Discharge due to:_  []  Other:_      Lyndsey Correa PTA 8/2/2017  12:37 PM    Future Appointments  Date Time Provider Franko Lomas   8/4/2017 12:00 PM Lyndsey Caceres PTA [facility]   8/8/2017 11:30 AM Lyndsey Correa PTA [facility]   8/9/2017 11:30 AM Lyndsey Correa PTA [facility]   8/11/2017 12:30 PM Lyndsey Correa PTA [facility]

## 2017-08-04 ENCOUNTER — HOSPITAL ENCOUNTER (OUTPATIENT)
Dept: PHYSICAL THERAPY | Age: 32
Discharge: HOME | End: 2017-08-04
Payer: COMMERCIAL

## 2017-08-04 PROCEDURE — 97110 THERAPEUTIC EXERCISES: CPT

## 2017-08-04 PROCEDURE — 97032 APPL MODALITY 1+ESTIM EA 15: CPT

## 2017-08-04 NOTE — PROGRESS NOTES
PT DAILY TREATMENT NOTE - [facility]     Patient Name: Louise Keen  [id number]  : 1985  [x]  Patient  Verified  Payor: [facility] / Plan: [employer] / Product Type: Workers Comp /    In [employer] Response time:12:55  Total Treatment Time (min): 43  Visit #: 9 of 12    Treatment Area: Pain in right knee [M25.561]    SUBJECTIVE  Pain Level (0-10 scale): 1.5  Any medication changes, allergies to medications, adverse drug reactions, diagnosis change, or new procedure performed?: [x] No    [] Yes (see summary sheet for update)  Subjective functional status/changes:   [] No changes reported  Little pain.     OBJECTIVE    Modality rationale: decrease edema, decrease inflammation, decrease pain and increase tissue extensibility to improve the patients ability to perform ADL    Min Type Additional Details    [] Estim:  []Unatt       []IFC  []Premod                        []Other:  []w/ice   []w/heat  Position:  Location:   8 [x] Estim: [x]Att    []TENS instruct  []NMES                    [x]Other: LTO []w/US   []w/ice   []w/heat  Position:supine  Location:(R) knee    []  Traction: [] Cervical       []Lumbar                       [] Prone          []Supine                       []Intermittent   []Continuous Lbs:  [] before manual  [] after manual    []  Ultrasound: []Continuous   [] Pulsed                           []1MHz   []3MHz W/cm2:  Location:    []  Iontophoresis with dexamethasone         Location: [] Take home patch   [] In clinic    []  Ice     []  heat  []  Ice massage  []  Laser   []  Anodyne Position:  Location:    []  Laser with stim  []  Other:  Position:  Location:    []  Vasopneumatic Device Pressure:       [] lo [] med [] hi   Temperature: [] lo [] med [] hi   [x] Skin assessment post-treatment:  [x]intact []redness- no adverse reaction    []redness  adverse reaction:      min []Eval                  []Re-Eval       35 min Therapeutic Exercise:  [x] See flow sheet :   Rationale: increase ROM and increase strength to improve the patients ability to perform ADL      min Therapeutic Activity:  [x]  See flow sheet :   Rationale:   to improve the patients ability to       min Neuromuscular Re-education:  []  See flow sheet :   Rationale:   to improve the patients ability to *     min Manual Therapy:     Rationale: decrease pain, increase ROM, increase tissue extensibility and decrease edema  to perform ADL      min Gait Training:  ___ feet with ___ device on level surfaces with ___ level of assist   Rationale: With   [x] TE   [] TA   [] neuro   [] other: Patient Education: [x] Review HEP    [] Progressed/Changed HEP based on:   [] positioning   [] body mechanics   [] transfers   [] heat/ice application    [] other:      Other Objective/Functional Measures: added  2.5#  PRE's . 18 miles  TM  No difficulty    Pain Level (0-10 scale) post treatment: 1.5#    ASSESSMENT/Changes in Function: Responded  Fairly well  To each there ex. Patient will continue to benefit from skilled PT services to analyze and address soft tissue restrictions, analyze and cue movement patterns, analyze and modify body mechanics/ergonomics, assess and modify postural abnormalities, address imbalance/dizziness and instruct in home and community integration to attain remaining goals.      [x]  See Plan of Care  []  See progress note/recertification  []  See Discharge Summary         Progress towards goals / Updated goals:  1 patient will have established and be independent with HEP to aid with progression of skilled PT program                         MAXXAL issued                         GVYOQAK reports compliance 7/21/17 7/26/17                         2 patient will have pain 3/10 to aid with increase tolerance to ADLs and activities                         EVAL 5                         CURRENT 2 7/26/17                         3 patient will have FOTO  Improved to 62 to aid with increase tolerance to ADLs and activities                         SEDQ 23                         IJIZRCM  87 7/26/17      Long Term Goals: To be accomplished in 12 treatments:                         0 patient will have pain 1/10 to aid with increase tolerance to ADLs and activities                         EVAL 5                         CURRENT 2/10  7/26/17                         2 patient will have FOTO  Improved to 68 to aid with increase tolerance to ADLs and activities                         EVAL 56                         CURRENT  56 7/26/17                         3 patient will have ROM 0-120 to aid with improved gait mechanics for carryover to work demands                         EVAL -5 supine and 117 supine                         CURRENT  130  F    Ext   -5   7/24/17                         4 patient will have MMT R knee F/E/hip F 4+ to aid with increase tolerance to ADLS and activities                         EVAL Hip and knee F 4 and knee E4-                         CURRENT                         5 patient will ambulate on TM 1/4 mile with no significant increase pain for carryover to ADLS and activities and work demands                         EVAL no AD use, antalgic gait.                          CURRENT   5 min   No difficulty  8/2/17       PLAN  []  Upgrade activities as tolerated     [x]  Continue plan of care  []  Update interventions per flow sheet       []  Discharge due to:_  [x]  Other:_  REASSESS GOALS MD NOTE NV    Lyndsey Correa, PTA 8/4/2017  12:10 PM    Future Appointments  Date Time Provider Franko Lomas   8/8/2017 11:30 AM Delon Douse, PTA [facility]   8/9/2017 11:30 AM Lyndsey Correa, PTA [facility]   8/11/2017 12:30 PM Lyndsey Correa, PTA [facility]

## 2017-08-08 ENCOUNTER — APPOINTMENT (OUTPATIENT)
Dept: PHYSICAL THERAPY | Age: 32
End: 2017-08-08
Payer: COMMERCIAL

## 2017-08-09 ENCOUNTER — APPOINTMENT (OUTPATIENT)
Dept: PHYSICAL THERAPY | Age: 32
End: 2017-08-09
Payer: COMMERCIAL

## 2017-08-11 ENCOUNTER — APPOINTMENT (OUTPATIENT)
Dept: PHYSICAL THERAPY | Age: 32
End: 2017-08-11
Payer: COMMERCIAL

## 2017-08-15 ENCOUNTER — APPOINTMENT (OUTPATIENT)
Dept: PHYSICAL THERAPY | Age: 32
End: 2017-08-15
Payer: COMMERCIAL

## 2017-08-16 ENCOUNTER — APPOINTMENT (OUTPATIENT)
Dept: PHYSICAL THERAPY | Age: 32
End: 2017-08-16
Payer: COMMERCIAL

## 2017-08-30 ENCOUNTER — APPOINTMENT (OUTPATIENT)
Dept: PHYSICAL THERAPY | Age: 32
End: 2017-08-30
Payer: COMMERCIAL

## 2017-08-31 ENCOUNTER — APPOINTMENT (OUTPATIENT)
Dept: PHYSICAL THERAPY | Age: 32
End: 2017-08-31
Payer: COMMERCIAL

## 2017-09-05 ENCOUNTER — HOSPITAL ENCOUNTER (OUTPATIENT)
Dept: PHYSICAL THERAPY | Age: 32
Discharge: HOME | End: 2017-09-05
Payer: COMMERCIAL

## 2017-09-05 PROCEDURE — 97032 APPL MODALITY 1+ESTIM EA 15: CPT

## 2017-09-05 PROCEDURE — 97110 THERAPEUTIC EXERCISES: CPT

## 2017-09-05 NOTE — PROGRESS NOTES
[facility] [address], [address] [place] [address],[facility] 300  [phone] fax    Physician Update  [x] Progress Note  [] Discharge Summary  Patient name: Sandra Hall Start of Care: 17   Referral source: Jyoti Sol MD : 1985   Medical/Treatment Diagnosis: Pain in right knee [M25.561] Onset Date:16     Prior Hospitalization: see medical history Provider#: [id number]   Medications: Verified on Patient Summary List    Comorbidities:none  Prior Level of Function:I all areas of ADLS and activities, no AD use, works full time ,household, reading, hiking, running and electronics           Visits from [place] of Care: 10    Missed Visits: 1    Status at Evaluation/Last Progress Note: eval and plan of care  Progress towards Goals: Pian now 2, FOTO improved to 72, He reports pain with descending steps, Knee MMT F /EXT    4+  and AROM Knee  F  135    EXT  -4 . Overall he reports 50% improvement. Patient demonstrates the potential to make further gains with improving ROM, strength, endurance/activity tolerance, functional FOTO survey score  and all within a reasonable time frame so as to further increase their functional independence with ADLs and activities for carryover to  Improved quality of life, tolerance to work demands and household chores. Patient requires skilled Physical Therapy so as to monitor their response to and modify their treatment plan accordingly. Patient appears to be an appropriate candidate for skilled outpatient Physical Therapy.     Goals: to be achieved in 6-10 treatments  ONLY HAS 2 remaining approved:                        1 patient will have pain 1/10 to aid with increase tolerance to ADLs and activities                         QB 2                         CURRENT                          2 patient will have FOTO  Improved to 78 to aid with increase tolerance to ADLs and activities                         PN 67                         CURRENT                            9 patient will have MMT R knee F/E/hip F 5 to aid with increase tolerance to ADLS and activities                         PN MMT Kneed F/E 4+                         CURRENT                            5 patient will ambulate on TM 1/4-1/2 mile with no significant increase pain for carryover to ADLS and activities and work demands                        PN . 19 miles in 5:19 minutes                         CURRENT       ASSESSMENT/RECOMMENDATIONS:  [x]Continue therapy per initial plan/protocol at a frequency of  2-3 x per week for 6-10 treatments, only has 2 remaining approved    []Continue therapy with the following recommended changes:_____________________      _____________________________________________________________________  []Discontinue therapy progressing towards or have reached established goals  []Discontinue therapy due to lack of appreciable progress towards goals  []Discontinue therapy due to lack of attendance or compliance  [x]Await Physician's recommendations/decisions regarding therapy  []Other:________________________________________________________________    Thank you for this referral. Emma Payton, PT 9/5/2017 11:12 AM  NOTE TO PHYSICIAN:  PLEASE COMPLETE THE ORDERS BELOW AND   FAX TO [facility]: [phone]  If you are unable to process this request in 24 hours please contact our office: [phone]    ? I have read the above report and request that my patient continue as recommended. ? I have read the above report and request that my patient continue therapy with the following changes/special instructions:_____________________________________  ? I have read the above report and request that my patient be discharged from therapy.     Physicians signature: __________________________Date: ________Time:________

## 2017-09-05 NOTE — PROGRESS NOTES
PT DAILY TREATMENT NOTE - [facility]     Patient Name: Balaji Schaffer  IRXT:3/4/4207  : 1985  [x]  Patient  Verified  Payor: [address] [place] [address] Road / Plan: Michelle Silva / Product Type: Workers Comp /    In time:7:37  Out time:8:35  Total Treatment Time (min): 46  Visit #: 10 of 12    Treatment Area: Pain in right knee [M25.561]    SUBJECTIVE  Pain Level (0-10 scale): 2  Any medication changes, allergies to medications, adverse drug reactions, diagnosis change, or new procedure performed?: [x] No    [] Yes (see summary sheet for update)  Subjective functional status/changes:   [] No changes reported  Jake  When  coming  Down  Steps.     OBJECTIVE    Modality rationale: decrease edema, decrease inflammation, decrease pain and increase tissue extensibility to improve the patients ability to perform ADL    Min Type Additional Details    [] Estim:  []Unatt       []IFC  []Premod                        []Other:  []w/ice   []w/heat  Position:  Location:   8 [x] Estim: [x]Att    []TENS instruct  []NMES                    [x]Other: LTO []w/US   []w/ice   []w/heat  Position:long sit  Location:(R) superior  knee    []  Traction: [] Cervical       []Lumbar                       [] Prone          []Supine                       []Intermittent   []Continuous Lbs:  [] before manual  [] after manual    []  Ultrasound: []Continuous   [] Pulsed                           []1MHz   []3MHz W/cm2:  Location:    []  Iontophoresis with dexamethasone         Location: [] Take home patch   [] In clinic    []  Ice     []  heat  []  Ice massage  []  Laser   []  Anodyne Position:  Location:    []  Laser with stim  []  Other:  Position:  Location:    []  Vasopneumatic Device Pressure:       [] lo [] med [] hi   Temperature: [] lo [] med [] hi   [x] Skin assessment post-treatment:  [x]intact []redness- no adverse reaction    []redness  adverse reaction:      min []Eval                  []Re-Eval       38 min Therapeutic Exercise:  [x] See flow sheet : Rationale: increase ROM and increase strength to improve the patients ability to perform ADL      min Therapeutic Activity:  []  See flow sheet :   Rationale:   to improve the patients ability to       min Neuromuscular Re-education:  []  See flow sheet :   Rationale:   to improve the patients ability to      min Manual Therapy:     Rationale: decrease pain, increase ROM, increase tissue extensibility and decrease edema  to perform ADL      min Gait Training:  ___ feet with ___ device on level surfaces with ___ level of assist   Rationale: With   [x] TE   [] TA   [] neuro   [] other: Patient Education: [x] Review HEP    [] Progressed/Changed HEP based on:   [] positioning   [] body mechanics   [] transfers   [] heat/ice application    [] other:      Other Objective/Functional Measures: Knee  F /EXT    4+  Knee  F  135    EXT  -4   FOTO:  72  Pain Level (0-10 scale) post treatment: 0  ASSESSMENT/Changes in Function: Mr. Mary Zhao  Reports  50%  Improvement. Pain level  On average  Is  2/10. Pt  Mainly  C/o  Pain  When  Descending  Steps/curb  At  Superior  Knee  cap    Patient will continue to benefit from skilled PT services to address functional mobility deficits, address ROM deficits, address strength deficits, analyze and address soft tissue restrictions, analyze and cue movement patterns and instruct in home and community integration to attain remaining goals.      [x]  See Plan of Care  []  See progress note/recertification  []  See Discharge Summary         Progress towards goals / Updated goals:  1 patient will have established and be independent with HEP to aid with progression of skilled PT program                         EVAL issued                         [id number] reports compliance 7/21/17 7/26/17                         2 patient will have pain 3/10 to aid with increase tolerance to ADLs and activities                         EVAL 5                         CURRENT 2 7/26/17                         3 patient will have FOTO  Improved to 62 to aid with increase tolerance to ADLs and activities                         EVAL 56                         CURRENT  56 7/26/17      Long Term Goals: To be accomplished in 12 treatments:                         1 patient will have pain 1/10 to aid with increase tolerance to ADLs and activities                         EVAL 5                         CURRENT 2/10  7/26/17                         2 patient will have FOTO  Improved to 68 to aid with increase tolerance to ADLs and activities                         EVAL 56                         CURRENT  56 7/26/17                         3 patient will have ROM 0-120 to aid with improved gait mechanics for carryover to work demands                         EVAL -5 supine and 117 supine                         CURRENT  135  F    Ext   -4   9/5/17                         4 patient will have MMT R knee F/E/hip F 4+ to aid with increase tolerance to ADLS and activities                         EVAL Hip and knee F 4 and knee E4-                         CURRENT  Knee  F/EXT  4+  9/5/17                         5 patient will ambulate on TM 1/4 mile with no significant increase pain for carryover to ADLS and activities and work demands                         EVAL no AD use, antalgic gait.                          CURRENT   5 min   No difficulty  8/2/17    PLAN  []  Upgrade activities as tolerated     [x]  Continue plan of care  []  Update interventions per flow sheet       []  Discharge due to:_  [x]  Other:_ FAX  MD NOTE     Ovidio Kwok PTA 9/5/2017  7:45 AM    Future Appointments  Date Time Provider Franko Lomas   9/8/2017 7:30 AM Emma Payton PT [facility]   9/12/2017 8:00 AM Lyndsey Correa PTA [facility]

## 2017-09-06 ENCOUNTER — APPOINTMENT (OUTPATIENT)
Dept: PHYSICAL THERAPY | Age: 32
End: 2017-09-06
Payer: COMMERCIAL

## 2017-09-08 ENCOUNTER — HOSPITAL ENCOUNTER (OUTPATIENT)
Dept: PHYSICAL THERAPY | Age: 32
Discharge: HOME | End: 2017-09-08
Payer: COMMERCIAL

## 2017-09-08 ENCOUNTER — APPOINTMENT (OUTPATIENT)
Dept: PHYSICAL THERAPY | Age: 32
End: 2017-09-08
Payer: COMMERCIAL

## 2017-09-08 PROCEDURE — 97110 THERAPEUTIC EXERCISES: CPT

## 2017-09-08 PROCEDURE — 97530 THERAPEUTIC ACTIVITIES: CPT

## 2017-09-08 PROCEDURE — 97032 APPL MODALITY 1+ESTIM EA 15: CPT

## 2017-09-08 NOTE — PROGRESS NOTES
PT DAILY TREATMENT NOTE     Patient Name: Erika Band  BGWU:[id number]  : 1985  [x]  Patient  Verified  Payor: [address] [place] [address] Road / Plan: Rebaspenser Galo / Product Type: Workers Comp /    In time:730  Out time:818  Total Treatment Time (min):45  Visit #: 11 of 12    Treatment Area: Pain in right knee [M25.561]    SUBJECTIVE  Pain Level (0-10 scale):  2  Any medication changes, allergies to medications, adverse drug reactions, diagnosis change, or new procedure performed?: [x] No    [] Yes (see summary sheet for update)  Subjective functional status/changes:   [] No changes reported  Still hurts with descending stairs and curbs > than anything else    OBJECTIVE    Modality rationale: decrease inflammation, decrease pain and increase tissue extensibility to improve the patients ability to aid with increase tolerance to ADLS    Min Type Additional Details    [] Estim:  []Unatt       []IFC  []Premod                        []Other:  []w/ice   []w/heat  Position:  Location:   8 [x] Estim: [x]Att    []TENS instruct  []NMES                    [x]Other:LTO  []w/US   []w/ice   []w/heat  Position:reclined  Location:R supra lateral patellar region    []  Traction: [] Cervical       []Lumbar                       [] Prone          []Supine                       []Intermittent   []Continuous Lbs:  [] before manual  [] after manual    []  Ultrasound: []Continuous   [] Pulsed                           []1MHz   []3MHz W/cm2:  Location:    []  Iontophoresis with dexamethasone         Location: [] Take home patch   [] In clinic   PD []  Ice     []  heat  []  Ice massage  []  Laser   []  Anodyne Position:  Location:    []  Laser with stim  []  Other:  Position:  Location:    []  Vasopneumatic Device Pressure:       [] lo [] med [] hi   Temperature: [] lo [] med [] hi   [] Skin assessment post-treatment:  []intact []redness- no adverse reaction    []redness  adverse reaction:       min []Eval                  []Re-Eval       29 min Therapeutic Exercise:  [x] See flow sheet :   Rationale: increase ROM, increase strength, improve coordination, improve balance and increase proprioception to improve the patients ability to aid with increase tolerance to ADLS and activities    8 min Therapeutic Activity:  [x]  See flow sheet :TM, VC safety with pace   Rationale: improve coordination, improve balance and increase proprioception  to improve the patients ability to aid with carryover to community ambulation and work demands      min Neuromuscular Re-education:  []  See flow sheet :   Rationale:   to improve the patients ability to      min Manual Therapy:     Rationale:  to      min Gait Training:  ___ feet with ___ device on level surfaces with ___ level of assist   Rationale: With   [] TE   [] TA   [] neuro   [] other: Patient Education: [x] Review HEP    [] Progressed/Changed HEP based on:   [] positioning   [] body mechanics   [] transfers   [] heat/ice application    [] other:      Other Objective/Functional Measures: Improved pace on TM  . 22 miles in 5:25 time, VC exercises, intermittent pain noted throughout exercises. Pain Level (0-10 scale) post treatment: 2    ASSESSMENT/Changes in Function: residual pain supra lateral patellar region R knee    Patient will continue to benefit from skilled PT services to modify and progress therapeutic interventions, address functional mobility deficits, address ROM deficits, address strength deficits, analyze and address soft tissue restrictions, analyze and cue movement patterns, analyze and modify body mechanics/ergonomics, assess and modify postural abnormalities and instruct in home and community integration to attain remaining goals.      [x]  See Plan of Care  []  See progress note/recertification  []  See Discharge Summary         Goals: to be achieved in 6-10 treatments   per latest MD note                        1 patient will have pain 1/10 to aid with increase tolerance to ADLs and activities                         YY 2                         CURRENT  2 9/8/17                         2 patient will have [address]  Improved to 66 to aid with increase tolerance to ADLs and activities                         PN 72                         CURRENT                            4 patient will have MMT R knee F/E/hip F 5 to aid with increase tolerance to ADLS and activities                         PN MMT Kneed F/E 4+                         CURRENT                            5 patient will ambulate on TM 1/4-1/2 mile with no significant increase pain for carryover to ADLS and activities and work demands                        PN . 19 miles in 5:19 minutes                         CURRENT     PLAN  [x]  Upgrade activities as tolerated     [x]  Continue plan of care  []  Update interventions per flow sheet       []  Discharge due to:_  []  Other:_      Aris Franco PT 9/8/2017  7:34 AM    Future Appointments  Date Time Provider Franko Lomas   9/12/2017 8:00 AM Lyndsey Correa PTA [facility]

## 2017-09-12 ENCOUNTER — HOSPITAL ENCOUNTER (OUTPATIENT)
Dept: PHYSICAL THERAPY | Age: 32
Discharge: HOME | End: 2017-09-12
Payer: COMMERCIAL

## 2017-09-12 PROCEDURE — 97032 APPL MODALITY 1+ESTIM EA 15: CPT

## 2017-09-12 PROCEDURE — 97110 THERAPEUTIC EXERCISES: CPT

## 2017-09-12 NOTE — PROGRESS NOTES
PT DAILY TREATMENT NOTE - [facility]     Patient Name: Carlos Manuel Capellan  Date:2017  : 1985  [x]  Patient  Verified  Payor: [address] [place] [address] Road / Plan: Wolf Mineralso / Product Type: Workers Comp /    In time:8:00  Out time:8:45  Total Treatment Time (min): 41  Visit #: 12 of 12    Treatment Area: Pain in right knee [M25.561]    SUBJECTIVE  Pain Level (0-10 scale): 2  Any medication changes, allergies to medications, adverse drug reactions, diagnosis change, or new procedure performed?: [x] No    [] Yes (see summary sheet for update)  Subjective functional status/changes:   [] No changes reported  Pain  At  Top of  Knee.     OBJECTIVE    Modality rationale: decrease edema, decrease inflammation, decrease pain and increase tissue extensibility to improve the patients ability to perform ADL    Min Type Additional Details    [] Estim:  []Unatt       []IFC  []Premod                        []Other:  []w/ice   []w/heat  Position:  Location:   8 [x] Estim: [x]Att    []TENS instruct  []NMES                    [x]Other: LTO []w/US   []w/ice   []w/heat  Position:long  sit  Location:superior  (R knee    []  Traction: [] Cervical       []Lumbar                       [] Prone          []Supine                       []Intermittent   []Continuous Lbs:  [] before manual  [] after manual    []  Ultrasound: []Continuous   [] Pulsed                           []1MHz   []3MHz W/cm2:  Location:    []  Iontophoresis with dexamethasone         Location: [] Take home patch   [] In clinic    []  Ice     []  heat  []  Ice massage  []  Laser   []  Anodyne Position:  Location:    []  Laser with stim  []  Other:  Position:  Location:    []  Vasopneumatic Device Pressure:       [] lo [] med [] hi   Temperature: [] lo [] med [] hi   [x] Skin assessment post-treatment:  [x]intact []redness- no adverse reaction    []redness  adverse reaction:      min []Eval                  []Re-Eval       33 min Therapeutic Exercise:  [x] See flow sheet : Rationale: increase ROM and increase strength to improve the patients ability to perform ADL      min Therapeutic Activity:  []  See flow sheet :   Rationale:   to improve the patients ability to       min Neuromuscular Re-education:  []  See flow sheet :   Rationale:   to improve the patients ability to      min Manual Therapy:     Rationale: decrease pain, increase ROM, increase tissue extensibility and decrease edema  to perform ADL      min Gait Training:  ___ feet with ___ device on level surfaces with ___ level of assist   Rationale: With   [x] TE   [] TA   [] neuro   [] other: Patient Education: [x] Review HEP    [] Progressed/Changed HEP based on:   [] positioning   [] body mechanics   [] transfers   [] heat/ice application    [] other:      Other Objective/Functional Measures:  Fair  Response  To each there ex. Pain Level (0-10 scale) post treatment: 1    ASSESSMENT/Changes in Function: Benefited  With Treatment. Patient will continue to benefit from skilled PT services to address functional mobility deficits, address ROM deficits, address strength deficits, analyze and address soft tissue restrictions and instruct in home and community integration to attain remaining goals.      [x]  See Plan of Care  []  See progress note/recertification  []  See Discharge Summary         Progress towards goals / Updated goals:  1 patient will have pain 1/10 to aid with increase tolerance to ADLs and activities                         PN 2                         CURRENT  2 9/8/17                         2 patient will have FOTO  Improved to 78 to aid with increase tolerance to ADLs and activities                         ME 45                         CURRENT                            1 patient will have MMT R knee F/E/hip F 5 to aid with increase tolerance to ADLS and activities                         CR MMT Kneed F/E 4+                         CURRENT                            5 patient will ambulate on TM 1/4-1/2 mile with no significant increase pain for carryover to ADLS and activities and work demands                        PN . 19 miles in 5:19 minutes                         [id number]        PLAN  []  Upgrade activities as tolerated     [x]  Continue plan of care  []  Update interventions per flow sheet       []  Discharge due to:_  []  Other:_      Lyndsey Correa, PTA 9/12/2017  8:31 AM    No future appointments.

## 2017-10-26 NOTE — PROGRESS NOTES
[facility]  [address] [facility] [address] [facility], [address] [address],[place]  [phone] fax    Discharge Summary    Patient name: Jarrett Funes     Start of Care:17  Referral source: Mariely Robles MD    : 1985  Medical/Treatment Diagnosis: Pain in right knee [M25.561]  Onset Date:16  Prior Hospitalization: see medical history   Provider#: [id number]  Comorbidities: none  Prior Level of Function: I all areas of ADLS and activities, no AD use, works full time ,household, reading, hiking, running and electronics     Medications: Verified on Patient Summary List    Visits from Start of Care:12    Missed Visits: 1  Reporting Period : 17 to 17      Summary of Care:Patient seen for 12 approved sessions. He did not return for any additional visits. Please see latest MD note for specifics. He should follow up with his MD as needed. He has exercises for his HEP. Thank you.    Goal: patient will have established and be independent with HEP to aid with progression of skilled PT program  Status at last note/certification:eval  Status at discharge: met    Sanaz Mauirce will have pain 3/10 to aid with increase tolerance to ADLs and activities  Status at last note/certification:eval  Status at discharge: met    Goal: patient will have FOTO  Improved to 62 to aid with increase tolerance to ADLs and activities   Status at last note/certification:eval  Status at discharge: met    Goal:patient will have ROM 0-120 to aid with improved gait mechanics for carryover to work demands  Status at last note/certification:eval  Status at discharge: met      ASSESSMENT/RECOMMENDATIONS:  []Discontinue therapy progressing towards or have reached established goals  []Discontinue therapy due to lack of appreciable progress towards goals  []Discontinue therapy due to lack of attendance or compliance  [x]Other:no further contact  Thank you for this referral.     Chelle Branch, PT 10/26/2017 6:13 PM